# Patient Record
(demographics unavailable — no encounter records)

---

## 2021-09-01 NOTE — PHYS DOC
Past Medical History


Past Medical History:  Anxiety, Bipolar


Additional Past Medical Histor:  jaw broken


 (GOLDIE ZARAGOZA APRN)


Past Surgical History:  No Surgical History


 (GOLDIE ZARAGOZA APRN)


Smoking Status:  Never Smoker


Alcohol Use:  None


Drug Use:  None


 (GOLDIE ZARAGOZA APRN)





General Adult


EDM:


Chief Complaint:  MEDICATION REFILL





HPI:


HPI:





Patient is a 28  year old female with history of anxiety, bipolar, insomnia, who

presents to the ED today requesting a refill of Klonopin 1 mg twice daily that 

she takes for her chronic insomnia, and anxiety.  Patient states she has gone 

without the medicine for a while.  She initially stated she takes the medicine 

for seizures.  When I informed her we are not going to refill this medicine she 

needs to follow-up with a neurologist, she changed the story and stated she 

takes the medicine for insomnia and anxiety.  Informed patient we will not 

refill this medicine because there is no indication for emergency refill in the 

ED especially with the risk of abuse from Klonopin.  Inquired from patient where

she gets her medicines from.  She states she gets her medicines from [facility] and the doctor that used prescribed the medicine has just passed 

away and the urgent care is closed.  She also states she came from out of town. 

When I asked her to give me the name of the doctor that passed away, patient 

stated she can not remember


 (GOLDIE ZARAGOZA APRN)





Review of Systems:


Review of Systems:


Constitutional:   Denies fever or chills. [] 


Musculoskeletal:   Denies back pain or joint pain. [] 


Integument:   Denies rash. [] 


Neurologic:   Denies headache, focal weakness or sensory changes. [] [] 


Psychiatric: Request for Klonopin refill


 (GOLDIE ZARAGOZA APRN)





Heart Score:


C/O Chest Pain:  N/A


Risk Factors:


Risk Factors:  DM, Current or recent (<one month) smoker, HTN, HLP, family 

history of CAD, obesity.


Risk Scores:


Score 0 - 3:  2.5% MACE over next 6 weeks - Discharge Home


Score 4 - 6:  20.3% MACE over next 6 weeks - Admit for Clinical Observation


Score 7 - 10:  72.7% MACE over next 6 weeks - Early Invasive Strategies


 (GOLDIE ZARAGOZA APRN)





Current Medications:





Current Medications








 Medications


  (Trade)  Dose


 Ordered  Sig/Mikel  Start Time


 Stop Time Status Last Admin


Dose Admin


 


 Clonazepam


  (KlonoPIN)  1 mg  1X PACU  STAT  9/1/21 15:40


 9/1/21 15:41   











 (GOLDIE ZARAGOZA)





Allergies:


Allergies:





Allergies








Coded Allergies Type Severity Reaction Last Updated Verified


 


  Penicillins Allergy Severe anayphlaxis 9/1/21 Yes


 


  cyclobenzaprine Allergy Intermediate hives 9/1/21 Yes


 


  morphine Allergy Intermediate "boils" 9/1/21 Yes








 (GOLDIE ZARAGOZA)





Physical Exam:


PE:





Constitutional: Well developed, well nourished, no acute distress, non-toxic 

appearance. [] 


Skin: Warm, dry, no erythema, no rash. [] 


Back: No tenderness, no CVA tenderness. [] 


Extremities: No tenderness, no cyanosis, no clubbing, ROM intact, no edema. [] 


Neurologic: Alert and oriented X 3, normal motor function, normal sensory 

function, no focal deficits noted. []


Psychologic: Affect normal, judgement normal, mood normal. []


 (GOLDIE ZARAGOZA)





Current Patient Data:


Vital Signs:





                                   Vital Signs








  Date Time  Temp Pulse Resp B/P (MAP) Pulse Ox O2 Delivery O2 Flow Rate FiO2


 


9/1/21 15:05 98.7 88 16 108/63 (97) 100 Room Air  





 98.7       








 (GOLDIE ZARAGOZA)





EKG:


EKG:


[]


 (GOLDIE ZARAGOZA)





Radiology/Procedures:


Radiology/Procedures:


[]


 (GOLDIE ZARAGOZA)





Course & Med Decision Making:


Course & Med Decision Making


Pertinent Labs and Imaging studies reviewed. (See chart for details)





This is a 28-year-old female patient presenting to the ED today requesting a 

refill of Klonopin.  See HPI.  Her request was denied.  I recommended she 

follows up with a primary care doctor or [facility]








 (GOLDIE ZARAGOZA)


Course & Med Decision Making


I have reviewed and was available for consultation in the emergency department 

for this patient that was seen by midlevel provider. Agree with plan.





Bj Holman DO


 (BJ HOLMAN DO)


Fidel Disclaimer:


Dragon Disclaimer:


This electronic medical record was generated, in whole or in part, using a voice

 recognition dictation system.


 (GOLDIE ZARAGOZA)





Departure


Departure


Impression:  


   Primary Impression:  


   Medication refill


Disposition:  01 HOME / SELF CARE / HOMELESS


Condition:  STABLE


Referrals:  


NO PCP (PCP)


Follow-up with [facility] and a primary care doctor


Patient Instructions:  Medication Refill, Emergency Department





Additional Instructions:  


You were evaluated in the emergency room.  We highly recommend you establish 

care with a primary care doctor of [facility] for further refills 

of Klonopin.  There is no indication to refill this medicine in the emergency 

room











GOLDIE ZARAGOZA             Sep 1, 2021 15:50


BJ HOLMAN DO              Sep 2, 2021 06:06

## 2021-10-31 NOTE — PHYS DOC
Past Medical History


Past Medical History:  Anxiety, Bipolar


Additional Past Medical Histor:  jaw broken


Past Surgical History:  No Surgical History


Smoking Status:  Never Smoker


Alcohol Use:  None


Drug Use:  None





General Adult


EDM:


Chief Complaint:  MEDICATION REFILL





HPI:


HPI:





Patient is a 28  year old female who presents with has been out of her Keppra 

and Ativan x2 days.  Patient has a appointment this coming Friday with a new 

primary care physician.  Denies chest pain, shortness of air, numbness or 

tingling, fever, headache, dizziness, recent seizure, drug use, alcohol use, 

abdominal pain, nausea, vomiting, diarrhea.  History of epilepsy, anxiety, 

bipolar.





Review of Systems:


Review of Systems:


Constitutional:   Denies fever or chills. +Medication refill[]


Eyes:   Denies change in visual acuity. []


HENT:   Denies nasal congestion or sore throat. [] 


Respiratory:   Denies cough or shortness of breath. [] 


Cardiovascular:   Denies chest pain or edema. [] 


GI:   Denies abdominal pain, nausea, vomiting, bloody stools or diarrhea. [] 


:  Denies dysuria. [] 


Musculoskeletal:   Denies back pain or joint pain. [] 


Integument:   Denies rash. [] 


Neurologic:   Denies headache, focal weakness or sensory changes. [] 


Endocrine:   Denies polyuria or polydipsia. [] 


Lymphatic:  Denies swollen glands. [] 


Psychiatric:  Denies depression or anxiety. []





Heart Score:


C/O Chest Pain:  No





Allergies:


Allergies:





Allergies








Coded Allergies Type Severity Reaction Last Updated Verified


 


  Penicillins Allergy Severe anayphlaxis 9/1/21 Yes


 


  cyclobenzaprine Allergy Intermediate hives 9/1/21 Yes


 


  morphine Allergy Intermediate "boils" 9/1/21 Yes











Physical Exam:


PE:





Constitutional: Well developed, well nourished, no acute distress, non-toxic 

appearance. []


HENT: Normocephalic, atraumatic, bilateral external ears normal, oropharynx 

moist, no oral exudates, nose normal. []


Eyes: PERRLA, EOMI, conjunctiva normal, no discharge. [] 


Neck: Normal range of motion, no tenderness, supple, no stridor. [] 


Cardiovascular:Heart rate regular rhythm, no murmur []


Lungs & Thorax:  Bilateral breath sounds clear to auscultation []


Abdomen: Bowel sounds normal, soft, no tenderness, no masses, no pulsatile 

masses. [] 


Skin: Warm, dry, no erythema, no rash. [] 


Back: No tenderness, no CVA tenderness. [] 


Extremities: No tenderness, no cyanosis, no clubbing, ROM intact, no edema. [] 


Neurologic: Alert and oriented X 3, normal motor function, normal sensory 

function, no focal deficits noted. []


Psychologic: Affect normal, judgement normal, mood normal. []





**Normal Physical Exam





EKG:


EKG:


[]





Radiology/Procedures:


Radiology/Procedures:


[]





Course & Med Decision Making:


Course & Med Decision Making


Pertinent Labs and Imaging studies reviewed. (See chart for details)





See HPI.  Alert and oriented x4.  Ambulatory with a steady gait.  Skin pink warm

 and dry.  Speaks in full clear sentences.  Vital signs within normal limits.  

PERRLA.





[]





Dragon Disclaimer:


Dragon Disclaimer:


This electronic medical record was generated, in whole or in part, using a voice

 recognition dictation system.





Departure


Departure


Impression:  


   Primary Impression:  


   Medication refill


Disposition:  01 HOME / SELF CARE / HOMELESS


Condition:  STABLE


Referrals:  


NO PCP (PCP)


Patient Instructions:  Medication Refill, Emergency Department





Additional Instructions:  


Follow-up with your primary care provider this coming Friday as scheduled.  Take

 medication as it is prescribed.  Do not drive or drink alcohol or do other 

drugs on top of this as they will make you sleepy.


Scripts


Lorazepam (ATIVAN) 2 Mg Tablet


2 MG PO BID, #10 TAB


   Prov: KAYLYN RAMOS APRN         11/1/21 


Levetiracetam (KEPPRA) 500 Mg Tablet


1 TAB PO BID for 30 Days, #60 TAB 0 Refills


   Prov: KAYLYN RAMOS APRN         11/1/21











KAYLYN RAMOS APRN            Oct 31, 2021 23:52